# Patient Record
Sex: MALE | Race: WHITE | URBAN - METROPOLITAN AREA
[De-identification: names, ages, dates, MRNs, and addresses within clinical notes are randomized per-mention and may not be internally consistent; named-entity substitution may affect disease eponyms.]

---

## 2017-10-05 ENCOUNTER — PRE VISIT (OUTPATIENT)
Dept: UROLOGY | Facility: CLINIC | Age: 37
End: 2017-10-05

## 2017-10-05 NOTE — TELEPHONE ENCOUNTER
PREVISIT INFORMATION                                                    Jimmie Peña scheduled for future visit at [facility] specialty clinics.    Patient is scheduled to see Nayanai on 10/06/2017  Reason for visit: urinary frequency   Referring provider: mary  Has patient seen previous specialist?   Medical Records:  Left message to return call to clinic     REVIEW                                                      New patient packet mailed to patient: Yes  Medication reconciliation complete: No      PLAN/FOLLOW-UP NEEDED                                                      Patient Reminders Given:    Informed patient to bring an updated list of allergies, medications, pharmacy details and insurance information. Directed patient to come to the 2nd floor, check-in D for their appointment. Informed patient to call back if appointment needs to be cancelled or rescheduled at [phone].    Reminded patient to bring any outside records regarding this appointment or have them faxed to clinic at [phone].    Reminded patient to complete and bring in urology questionnaire. Also for patient to please come with a full bladder and to ask , if early to get staff member for sample.

## 2019-09-03 ENCOUNTER — RECORDS - HEALTHEAST (OUTPATIENT)
Dept: ADMINISTRATIVE | Facility: OTHER | Age: 39
End: 2019-09-03

## 2020-07-29 ENCOUNTER — COMMUNICATION - HEALTHEAST (OUTPATIENT)
Dept: FAMILY MEDICINE | Facility: CLINIC | Age: 40
End: 2020-07-29

## 2020-07-29 ENCOUNTER — OFFICE VISIT - HEALTHEAST (OUTPATIENT)
Dept: FAMILY MEDICINE | Facility: CLINIC | Age: 40
End: 2020-07-29

## 2020-07-30 ENCOUNTER — OFFICE VISIT - HEALTHEAST (OUTPATIENT)
Dept: FAMILY MEDICINE | Facility: CLINIC | Age: 40
End: 2020-07-30

## 2020-07-30 DIAGNOSIS — H65.01 RIGHT ACUTE SEROUS OTITIS MEDIA, RECURRENCE NOT SPECIFIED: ICD-10-CM

## 2020-09-15 ENCOUNTER — RECORDS - HEALTHEAST (OUTPATIENT)
Dept: ADMINISTRATIVE | Facility: OTHER | Age: 40
End: 2020-09-15

## 2021-06-10 NOTE — PROGRESS NOTES
"Patrizia Peña is a 39 y.o. male who is being evaluated via a billable video visit.      The patient has been notified of following:     \"This video visit will be conducted via a call between you and your physician/provider. We have found that certain health care needs can be provided without the need for an in-person physical exam.  This service lets us provide the care you need with a video conversation.  If a prescription is necessary we can send it directly to your pharmacy.  If lab work is needed we can place an order for that and you can then stop by our lab to have the test done at a later time.    Video visits are billed at different rates depending on your insurance coverage. Please reach out to your insurance provider with any questions.    If during the course of the call the physician/provider feels a video visit is not appropriate, you will not be charged for this service.\"    Patient has given verbal consent to a Video visit? Yes  How would you like to obtain your AVS? AVS Preference: Mail a copy.    Will anyone else be joining your video visit? No        Video Start Time: 131    Additional provider notes: 39-year-old male following up via virtual visit for right-sided ear pain is been present for approximately 3 days.  Is intermittent but radiates down his right jaw.  It is not present on the left side of his jaw or his left ear.  He has been using Tylenol to help reduce the pain is been using 3 tablets of Tylenol over the last 24 hours.  He says it is difficult to chew on the right side of his jaw.    Patient denies any cough, fever, shortness of breath or headache.  He has not had any emesis.  He has not no difficulty swallowing.    He notes no change in hearing.  The pain is a throbbing type of pain  He admits that he may have cleaned his right ear too vigorously with a Q-tip.  No recent travel history noted  Patient had similar pain in the past in 2017 he went to the emergency room that " note was reviewed he was asked to use a peroxide the ear exam was noted to be normal.    Video-Visit Details    Type of service:  Video Visit    Video End Time (time video stopped): 148  Originating Location (pt. Location): Home    Distant Location (provider location):  [facility]      Platform used for Video Visit: AmWell  Examination  Moderate built male sitting in a living room no acute distress  HEENT no lymph enlargement noted by the patient no sinus tenderness noted by the patient pupils are equal reactive to light  No tenderness noted over the right auricle    1. Right acute serous otitis media, recurrence not specified Augmentin started for the patient side effects discussed in detail if his symptoms persist after the antibiotics are completed he needs to come in and be evaluated amoxicillin-clavulanate (AUGMENTIN) 875-125 mg per tablet         Albert Barrera MD

## 2021-06-10 NOTE — TELEPHONE ENCOUNTER
Who is calling:  Patient   Reason for Call:  Patient states his right ear is very painful for 3 days ,he also scheduled Evist  . Per patient no fever, cold or cough . Patient is requesting providers suggestions.   Date of last appointment with primary care: unknown   Okay to leave a detailed message: No

## 2021-07-26 ENCOUNTER — TRANSFERRED RECORDS (OUTPATIENT)
Dept: HEALTH INFORMATION MANAGEMENT | Facility: CLINIC | Age: 41
End: 2021-07-26

## 2021-08-21 ENCOUNTER — HEALTH MAINTENANCE LETTER (OUTPATIENT)
Age: 41
End: 2021-08-21

## 2021-10-16 ENCOUNTER — HEALTH MAINTENANCE LETTER (OUTPATIENT)
Age: 41
End: 2021-10-16

## 2022-01-13 ENCOUNTER — NURSE TRIAGE (OUTPATIENT)
Dept: NURSING | Facility: CLINIC | Age: 42
End: 2022-01-13

## 2022-01-13 NOTE — TELEPHONE ENCOUNTER
Pt reports he just tested positive for Covid on 1/13/2022. Pt reports he thinks he may have started symptoms on 1/12/2022. Pt reports cough, and congestion, runny nose, nasal congestion, fatigue, body aches, headache,  sore throat, and mild chest tightness.  Pt denies any shortness of breath, or difficulty breathing.   Care advice given, isolation was discussed, increase fluid intake, use of humidified air, Tylenol for temp over 102.0, use of Neti pot, warm chicken broth or apple juice and per protocol pt should be able to treat this at home. Pt agrees with plan, and was advised to call back if he develops shortness of breath, difficulty breathing, increased chest tightness, or if symptoms worsen. Pt verbalized understanding.     COVID 19 Nurse Triage Plan/Patient Instructions    Please be aware that novel coronavirus (COVID-19) may be circulating in the community. If you develop symptoms such as fever, cough, or SOB or if you have concerns about the presence of another infection including coronavirus (COVID-19), please contact your health care provider or visit [email or web address].     Disposition/Instructions    Home care recommended. Follow home care protocol based instructions.    Thank you for taking steps to prevent the spread of this virus.  o Limit your contact with others.  o Wear a simple mask to cover your cough.  o Wash your hands well and often.    Resources    [facility]: About COVID-19: [email or web address]    CDC: What to Do If You're Sick: www.cdc.gov/coronavirus/2019-ncov/about/steps-when-sick.html    CDC: Ending Home Isolation: www.cdc.gov/coronavirus/2019-ncov/hcp/disposition-in-home-patients.html     CDC: Caring for Someone: www.cdc.gov/coronavirus/2019-ncov/if-you-are-sick/care-for-someone.html     MD: Interim Guidance for Hospital Discharge to Home: www.health.[facility].[place]/diseases/coronavirus/hcp/hospdischarge.pdf    [facility] clinical trials  (COVID-19 research studies): [email or web address]-clinical-trials     Below are the COVID-19 hotlines at the [place] Department of Health ([facility]). Interpreters are available.   o For health questions: Call 356-807-0846 or 1-957.290.4328 (7 a.m. to 7 p.m.)  o For questions about schools and childcare: Call 442-991-6987 or 1-962.836.6147 (7 a.m. to 7 p.m.)     Reason for Disposition    COVID-19 Home Isolation, questions about    Additional Information    Negative: SEVERE difficulty breathing (e.g., struggling for each breath, speaks in single words)    Negative: Difficult to awaken or acting confused (e.g., disoriented, slurred speech)    Negative: Bluish (or gray) lips or face now    Negative: Shock suspected (e.g., cold/pale/clammy skin, too weak to stand, low BP, rapid pulse)    Negative: Sounds like a life-threatening emergency to the triager    Negative: [1] COVID-19 exposure AND [2] no symptoms    Negative: COVID-19 vaccine reaction suspected (e.g., fever, headache, muscle aches) occurring 1 to 3 days after getting vaccine    Negative: COVID-19 vaccine, questions about    Negative: [1] Lives with someone known to have influenza (flu test positive) AND [2] flu-like symptoms (e.g., cough, runny nose, sore throat, SOB; with or without fever)    Negative: [1] Adult with possible COVID-19 symptoms AND [2] triager concerned about severity of symptoms or other causes    Negative: COVID-19 and breastfeeding, questions about    Negative: SEVERE or constant chest pain or pressure (Exception: mild central chest pain, present only when coughing)    Negative: MODERATE difficulty breathing (e.g., speaks in phrases, SOB even at rest, pulse 100-120)    Negative: [1] Headache AND [2] stiff neck (can't touch chin to chest)    Negative: MILD difficulty breathing (e.g., minimal/no SOB at rest, SOB with walking, pulse <100)    Negative: Chest pain or pressure    Negative: Patient sounds very sick or weak to the triager     Negative: Fever > 103 F (39.4 C)    Negative: [1] Fever > 101 F (38.3 C) AND [2] age > 60 years    Negative: [1] Fever > 100.0 F (37.8 C) AND [2] bedridden (e.g., nursing home patient, CVA, chronic illness, recovering from surgery)    Negative: HIGH RISK for severe COVID complications (e.g., age > 64 years, obesity with BMI > 25, pregnant, chronic lung disease or other chronic medical condition)  (Exception: Already seen by PCP and no new or worsening symptoms.)    Negative: [1] HIGH RISK patient AND [2] influenza is widespread in the community AND [3] ONE OR MORE respiratory symptoms: cough, sore throat, runny or stuffy nose    Negative: [1] HIGH RISK patient AND [2] influenza exposure within the last 7 days AND [3] ONE OR MORE respiratory symptoms: cough, sore throat, runny or stuffy nose    Negative: [1] COVID-19 infection suspected by caller or triager AND [2] mild symptoms (cough, fever, or others) AND [3] negative COVID-19 rapid test    Negative: Fever present > 3 days (72 hours)    Negative: [1] Fever returns after gone for over 24 hours AND [2] symptoms worse or not improved    Negative: [1] Continuous (nonstop) coughing interferes with work or school AND [2] no improvement using cough treatment per protocol    Negative: Cough present > 3 weeks    Negative: [1] COVID-19 diagnosed by positive lab test AND [2] NO symptoms (e.g., cough, fever, others)    Negative: [1] COVID-19 diagnosed by positive lab test AND [2] mild symptoms (e.g., cough, fever, others) AND [3] no complications or SOB    Negative: [1] COVID-19 diagnosed by doctor (or NP/PA) AND [2] mild symptoms (e.g., cough, fever, others) AND [3] no complications or SOB    Negative: [1] COVID-19 diagnosed AND [2] has mild nausea, vomiting or diarrhea    Protocols used: CORONAVIRUS (COVID-19) DIAGNOSED OR [id number] 8.25.2021

## 2022-01-15 ENCOUNTER — APPOINTMENT (OUTPATIENT)
Dept: CT IMAGING | Facility: HOSPITAL | Age: 42
End: 2022-01-15
Attending: EMERGENCY MEDICINE
Payer: COMMERCIAL

## 2022-01-15 ENCOUNTER — HOSPITAL ENCOUNTER (EMERGENCY)
Facility: HOSPITAL | Age: 42
Discharge: HOME | End: 2022-01-15
Attending: EMERGENCY MEDICINE | Admitting: EMERGENCY MEDICINE
Payer: COMMERCIAL

## 2022-01-15 VITALS
TEMPERATURE: 99.1 F | WEIGHT: 235 LBS | HEART RATE: 84 BPM | RESPIRATION RATE: 16 BRPM | HEIGHT: 68 IN | OXYGEN SATURATION: 99 % | SYSTOLIC BLOOD PRESSURE: 108 MMHG | BODY MASS INDEX: 35.61 KG/M2 | DIASTOLIC BLOOD PRESSURE: 68 MMHG

## 2022-01-15 DIAGNOSIS — U07.1 INFECTION DUE TO 2019 NOVEL CORONAVIRUS: ICD-10-CM

## 2022-01-15 LAB
ALBUMIN SERPL-MCNC: 3.8 G/DL (ref 3.5–5)
ALP SERPL-CCNC: 75 U/L (ref 45–120)
ALT SERPL W P-5'-P-CCNC: 25 U/L (ref 0–45)
ANION GAP SERPL CALCULATED.3IONS-SCNC: 11 MMOL/L (ref 5–18)
AST SERPL W P-5'-P-CCNC: 29 U/L (ref 0–40)
ATRIAL RATE - MUSE: 70 BPM
BASOPHILS # BLD AUTO: 0 10E3/UL (ref 0–0.2)
BASOPHILS NFR BLD AUTO: 0 %
BILIRUB SERPL-MCNC: 1 MG/DL (ref 0–1)
BUN SERPL-MCNC: 16 MG/DL (ref 8–22)
C REACTIVE PROTEIN LHE: 5.5 MG/DL (ref 0–0.8)
CALCIUM SERPL-MCNC: 9.2 MG/DL (ref 8.5–10.5)
CHLORIDE BLD-SCNC: 104 MMOL/L (ref 98–107)
CO2 SERPL-SCNC: 23 MMOL/L (ref 22–31)
CREAT SERPL-MCNC: 0.75 MG/DL (ref 0.7–1.3)
D DIMER PPP FEU-MCNC: 1.02 UG/ML FEU (ref 0–0.5)
DIASTOLIC BLOOD PRESSURE - MUSE: NORMAL MMHG
EOSINOPHIL # BLD AUTO: 0.1 10E3/UL (ref 0–0.7)
EOSINOPHIL NFR BLD AUTO: 2 %
ERYTHROCYTE [DISTWIDTH] IN BLOOD BY AUTOMATED COUNT: 12.1 % (ref 10–15)
GFR SERPL CREATININE-BSD FRML MDRD: >90 ML/MIN/1.73M2
GLUCOSE BLD-MCNC: 108 MG/DL (ref 70–125)
HCT VFR BLD AUTO: 42.9 % (ref 40–53)
HGB BLD-MCNC: 15 G/DL (ref 13.3–17.7)
HOLD SPECIMEN: NORMAL
IMM GRANULOCYTES # BLD: 0 10E3/UL
IMM GRANULOCYTES NFR BLD: 0 %
INTERPRETATION ECG - MUSE: NORMAL
LIPASE SERPL-CCNC: 20 U/L (ref 0–52)
LYMPHOCYTES # BLD AUTO: 1.9 10E3/UL (ref 0.8–5.3)
LYMPHOCYTES NFR BLD AUTO: 32 %
MAGNESIUM SERPL-MCNC: 2 MG/DL (ref 1.8–2.6)
MCH RBC QN AUTO: 30.1 PG (ref 26.5–33)
MCHC RBC AUTO-ENTMCNC: 35 G/DL (ref 31.5–36.5)
MCV RBC AUTO: 86 FL (ref 78–100)
MONOCYTES # BLD AUTO: 0.6 10E3/UL (ref 0–1.3)
MONOCYTES NFR BLD AUTO: 10 %
NEUTROPHILS # BLD AUTO: 3.3 10E3/UL (ref 1.6–8.3)
NEUTROPHILS NFR BLD AUTO: 56 %
NRBC # BLD AUTO: 0 10E3/UL
NRBC BLD AUTO-RTO: 0 /100
P AXIS - MUSE: 11 DEGREES
PLATELET # BLD AUTO: 197 10E3/UL (ref 150–450)
POTASSIUM BLD-SCNC: 3.9 MMOL/L (ref 3.5–5)
PR INTERVAL - MUSE: 184 MS
PROT SERPL-MCNC: 8.1 G/DL (ref 6–8)
QRS DURATION - MUSE: 100 MS
QT - MUSE: 382 MS
QTC - MUSE: 412 MS
R AXIS - MUSE: 59 DEGREES
RBC # BLD AUTO: 4.99 10E6/UL (ref 4.4–5.9)
SODIUM SERPL-SCNC: 138 MMOL/L (ref 136–145)
SYSTOLIC BLOOD PRESSURE - MUSE: NORMAL MMHG
T AXIS - MUSE: 29 DEGREES
TROPONIN I SERPL-MCNC: <0.01 NG/ML (ref 0–0.29)
VENTRICULAR RATE- MUSE: 70 BPM
WBC # BLD AUTO: 5.9 10E3/UL (ref 4–11)

## 2022-01-15 PROCEDURE — 83735 ASSAY OF MAGNESIUM: CPT | Performed by: EMERGENCY MEDICINE

## 2022-01-15 PROCEDURE — 83690 ASSAY OF LIPASE: CPT | Performed by: EMERGENCY MEDICINE

## 2022-01-15 PROCEDURE — 258N000003 HC RX IP 258 OP 636: Performed by: EMERGENCY MEDICINE

## 2022-01-15 PROCEDURE — 85379 FIBRIN DEGRADATION QUANT: CPT | Performed by: EMERGENCY MEDICINE

## 2022-01-15 PROCEDURE — 86140 C-REACTIVE PROTEIN: CPT | Performed by: EMERGENCY MEDICINE

## 2022-01-15 PROCEDURE — 96374 THER/PROPH/DIAG INJ IV PUSH: CPT | Mod: 59

## 2022-01-15 PROCEDURE — 250N000011 HC RX IP 250 OP 636: Performed by: EMERGENCY MEDICINE

## 2022-01-15 PROCEDURE — 84484 ASSAY OF TROPONIN QUANT: CPT | Performed by: EMERGENCY MEDICINE

## 2022-01-15 PROCEDURE — 93005 ELECTROCARDIOGRAM TRACING: CPT | Performed by: EMERGENCY MEDICINE

## 2022-01-15 PROCEDURE — 99285 EMERGENCY DEPT VISIT HI MDM: CPT | Mod: 25

## 2022-01-15 PROCEDURE — 36415 COLL VENOUS BLD VENIPUNCTURE: CPT | Performed by: EMERGENCY MEDICINE

## 2022-01-15 PROCEDURE — 80053 COMPREHEN METABOLIC PANEL: CPT | Performed by: EMERGENCY MEDICINE

## 2022-01-15 PROCEDURE — 71275 CT ANGIOGRAPHY CHEST: CPT

## 2022-01-15 PROCEDURE — 85025 COMPLETE CBC W/AUTO DIFF WBC: CPT | Performed by: EMERGENCY MEDICINE

## 2022-01-15 RX ORDER — IOPAMIDOL 755 MG/ML
100 INJECTION, SOLUTION INTRAVASCULAR ONCE
Status: COMPLETED | OUTPATIENT
Start: 2022-01-15 | End: 2022-01-15

## 2022-01-15 RX ORDER — ONDANSETRON 4 MG/1
4-8 TABLET, ORALLY DISINTEGRATING ORAL EVERY 8 HOURS PRN
Qty: 20 TABLET | Refills: 0 | Status: SHIPPED | OUTPATIENT
Start: 2022-01-15

## 2022-01-15 RX ORDER — ONDANSETRON 4 MG/1
4-8 TABLET, ORALLY DISINTEGRATING ORAL EVERY 8 HOURS PRN
Qty: 20 TABLET | Refills: 0 | Status: SHIPPED | OUTPATIENT
Start: 2022-01-15 | End: 2022-01-15

## 2022-01-15 RX ORDER — ONDANSETRON 2 MG/ML
8 INJECTION INTRAMUSCULAR; INTRAVENOUS ONCE
Status: COMPLETED | OUTPATIENT
Start: 2022-01-15 | End: 2022-01-15

## 2022-01-15 RX ADMIN — IOPAMIDOL 100 ML: 755 INJECTION, SOLUTION INTRAVENOUS at 15:54

## 2022-01-15 RX ADMIN — ONDANSETRON 8 MG: 2 INJECTION INTRAMUSCULAR; INTRAVENOUS at 12:56

## 2022-01-15 RX ADMIN — SODIUM CHLORIDE 500 ML: 9 INJECTION, SOLUTION INTRAVENOUS at 12:59

## 2022-01-15 ASSESSMENT — ENCOUNTER SYMPTOMS
COUGH: 1
CONFUSION: 0
NECK STIFFNESS: 0
ABDOMINAL PAIN: 0
SHORTNESS OF BREATH: 0
FEVER: 0
LIGHT-HEADEDNESS: 1
DIZZINESS: 1
COLOR CHANGE: 0
NAUSEA: 1
WEAKNESS: 1
EYE REDNESS: 0
HEADACHES: 0
VOMITING: 1
DIFFICULTY URINATING: 0
ARTHRALGIAS: 0

## 2022-01-15 ASSESSMENT — MIFFLIN-ST. JEOR: SCORE: 1940.45

## 2022-01-15 NOTE — ED TRIAGE NOTES
Patient states  diagnosised with Covid one week ago, initially feeling well, within the last 3-4 days ago developed weakness, increasing dizziness, nausea.

## 2022-01-15 NOTE — ED PROVIDER NOTES
EMERGENCY DEPARTMENT ENCOUNTER     NAME: Casey Franco   AGE: 41 year old male   YOB: 1980   MRN: [id number]   EVALUATION DATE & TIME: No admission date for patient encounter.   PCP: No primary care provider on file.     Chief Complaint   Patient presents with     Covid Concern   :    FINAL IMPRESSION       1. Infection due to 2019 novel coronavirus           ED COURSE & MEDICAL DECISION MAKING    10:30 AM I performed my initial history and physical exam as well as discussed ED course and plan which includes labs and swabs.      Pertinent Labs & Imaging studies reviewed. (See chart for details)   41 year old male  presents to the Emergency Department for evaluation of Covid symptoms. Initial Vitals Reviewed. Initial exam notable for generally well-appearing patient with no respiratory distress, pulse ox 96%.  Patient did have a slightly low blood pressure at 96/58, and he was given some IV fluids as he has been having GI symptoms with COVID, vomiting and appeared dehydrated.  Fortunately his creatinine and electrolytes are acceptable, and he felt symptomatically improved after IV fluids and Zofran.  Due to a complaint of hemoptysis as well as lung pain, I did get a D-dimer which was elevated.  At time of signout, CT PE study is pending.  I anticipate the patient can be discharged with home Zofran and supportive care as he would not otherwise meet criteria for admission if the CT scan is negative.           At the conclusion of the encounter I discussed the results of all of the tests and the disposition. The questions were answered. The patient or family acknowledged understanding and was agreeable with the care plan.       MEDICATIONS GIVEN IN THE EMERGENCY:   Medications   0.9% sodium chloride BOLUS (0 mLs Intravenous Stopped 1/15/22 1325)   ondansetron (ZOFRAN) injection 8 mg (8 mg Intravenous Given 1/15/22 1256)      NEW PRESCRIPTIONS STARTED AT TODAY'S ER VISIT   New Prescriptions    No  medications on file     ================================================================   HISTORY OF PRESENT ILLNESS       Patient information was obtained from: Patient   Use of Intrepreter: N/A   Casey Franco is a 41 year old male with history of chronic vertigo who presents via private car with mother for evaluation of Covid symptoms.     Patient reports being diagnosed with Covid one week ago (1/11/22), noting that he was initially feeling well. Starting 4 days ago (1/11/22) he started developping weakness, progressing dizziness, lightheadedness, nausea, and vomiting. Patient also reports some hemoptysis this morning and endorses some pain in his lungs. He remarks his vomiting exacerbates his chronic vertigo. Patient is not covid vaccinated. No other complaints at this time.     Of note, patient is with his mother who has also tested positive for covid on 1/13/22.       ================================================================    REVIEW OF SYSTEMS       Review of Systems   Constitutional: Negative for fever.   HENT: Negative for congestion.    Eyes: Negative for redness.   Respiratory: Positive for cough (with blood). Negative for shortness of breath.    Cardiovascular: Negative for chest pain.   Gastrointestinal: Positive for nausea and vomiting. Negative for abdominal pain.   Genitourinary: Negative for difficulty urinating.   Musculoskeletal: Negative for arthralgias and neck stiffness.   Skin: Negative for color change.   Neurological: Positive for dizziness, weakness and light-headedness. Negative for headaches.   Psychiatric/Behavioral: Negative for confusion.   All other systems reviewed and are negative.       PAST HISTORY     PAST MEDICAL HISTORY:   No past medical history on file.   History reviewed.  No pertinent history  PAST SURGICAL HISTORY:   No past surgical history on file.   CURRENT MEDICATIONS:   No current outpatient medications on file.    ALLERGIES:   No Known Allergies  "  FAMILY HISTORY:   No family history on file.   SOCIAL HISTORY:   Social History     Socioeconomic History     Marital status: Not on file     Spouse name: Not on file     Number of children: Not on file     Years of education: Not on file     Highest education level: Not on file   Occupational History     Not on file   Tobacco Use     Smoking status: Not on file     Smokeless tobacco: Not on file   Substance and Sexual Activity     Alcohol use: Not on file     Drug use: Not on file     Sexual activity: Not on file   Other Topics Concern     Not on file   Social History Narrative     Not on file     Social Determinants of Health     Financial Resource Strain: Not on file   Food Insecurity: Not on file   Transportation Needs: Not on file   Physical Activity: Not on file   Stress: Not on file   Social Connections: Not on file   Intimate Partner Violence: Not on file   Housing Stability: Not on file        VITALS  Patient Vitals for the past 24 hrs:   BP Temp Pulse Resp SpO2 Height Weight   01/15/22 1300 96/58 -- 59 20 96 % -- --   01/15/22 1110 98/65 98.2  F (36.8  C) 80 20 98 % 1.727 m (5' 8\") 106.6 kg (235 lb)        ================================================================    PHYSICAL EXAM     VITAL SIGNS: BP 96/58   Pulse 59   Temp 98.2  F (36.8  C)   Resp 20   Ht 1.727 m (5' 8\")   Wt 106.6 kg (235 lb)   SpO2 96%   BMI 35.73 kg/m     Constitutional:  Awake, no acute distress   HENT:  Atraumatic, oropharynx without exudate or erythema, membranes moist  Lymph:  No adenopathy  Eyes: EOM intact, PERRL, no injection  Neck: Supple  Respiratory:  Clear to auscultation bilaterally, no wheezes or crackles   Cardiovascular:  Regular rate and rhythm, single S1 and S2   GI:  Soft, nontender, nondistended, no rebound or guarding   Musculoskeletal:  Moves all extremities, no lower extremity edema, no deformities    Skin:  Warm, dry  Neurologic:  Alert and oriented x3, no focal deficits noted "       ================================================================  LAB       All pertinent labs reviewed and interpreted.   Labs Ordered and Resulted from Time of ED Arrival to Time of ED Departure   COMPREHENSIVE METABOLIC PANEL - Abnormal       Result Value    Sodium 138      Potassium 3.9      Chloride 104      Carbon Dioxide (CO2) 23      Anion Gap 11      Urea Nitrogen 16      Creatinine 0.75      Calcium 9.2      Glucose 108      Alkaline Phosphatase 75      AST 29      ALT 25      Protein Total 8.1 (*)     Albumin 3.8      Bilirubin Total 1.0      GFR Estimate >90     D DIMER QUANTITATIVE - Abnormal    D-Dimer Quantitative 1.02 (*)    CRP INFLAMMATION - Abnormal    CRP 5.5 (*)    LIPASE - Normal    Lipase 20     MAGNESIUM - Normal    Magnesium 2.0     TROPONIN I - Normal    Troponin I <0.01     CBC WITH PLATELETS AND DIFFERENTIAL - Normal    WBC Count 5.9      RBC Count 4.99      Hemoglobin 15.0      Hematocrit 42.9      MCV 86      MCH 30.1      MCHC 35.0      RDW 12.1      Platelet Count 197          ===============================================================  RADIOLOGY       Reviewed all pertinent imaging. Please see official radiology report.   CT Chest Pulmonary Embolism w Contrast    (Results Pending)         ================================================================  EKG       EKG reviewed interpreted by me shows sinus rhythm with rate of 70, normal axis,  with no acute ST or T wave changes  I have independently reviewed and interpreted the EKG(s) documented above.     ================================================================  PROCEDURES         Jayy CANALES, am serving as a scribe to document services personally performed by Dr. Cohen based on my observation and the provider's statements to me. I, Farrah Cohen MD attest that Jayy Johnson is acting in a scribe capacity, has observed my performance of the services and has documented them in accordance with my  direction.   Farrah Cohen M.D.   Emergency Medicine   [facility] EMERGENCY DEPARTMENT  [facility] [facility] [id number] [phone]  Dept: [phone]        Farrah Cohen MD  01/15/22 1420

## 2022-01-15 NOTE — ED PROVIDER NOTES
"ED Provider In Triage Note  [facility]  Encounter Date: Justin 15, 2022    Chief Complaint   Patient presents with     Covid Concern       Brief HPI:   Patrizia Peña is a 41 year old male presenting to the Emergency Department with a chief complaint of covid infection, tested + 1/13/22.  Pt is unvaccinated.  Pt reports vomiting past 2-3 days, unable to keep much of anything down. Reports associated dyspnea and worsening lightheadedness.  Also reports myalgias, fatigue.  Pt feels very weak, asking to sit/lay down in triage.    Brief Physical Exam:  BP 96/58   Pulse 59   Temp 98.2  F (36.8  C)   Resp 20   Ht 1.727 m (5' 8\")   Wt 106.6 kg (235 lb)   SpO2 96%   BMI 35.73 kg/m    General: Non-toxic appearing  HEENT: Atraumatic  Resp: No respiratory distress  Abdomen: Non-peritoneal  Neuro: Alert, oriented, answers questions appropriately  Psych: Behavior appropriate      Plan Initiated in Triage:  Orders Placed This Encounter     Symptomatic; Unknown Influenza A/B & SARS-CoV2 (COVID-19) Virus PCR Multiplex       PIT Dispo:   Return to lobby while awaiting workup and ED bed availability  Pt reports increasing vomiting and weakness/dizziness.  Labs ordered.    Harsh Willard MD on 1/15/2022 at 11:07 AM    Patient was evaluated by the Physician in Triage due to a limitation of available rooms in the Emergency Department. A plan of care was discussed based on the information obtained on the initial evaluation and patient was consuled to return back to the Emergency Department lobby after this initial evalutaiton until results were obtained or a room became available in the Emergency Department. Patient was counseled not to leave prior to receiving the results of their workup.        Harsh Willard MD  01/15/22 1343    "

## 2022-01-15 NOTE — DISCHARGE INSTRUCTIONS
The tests in the ER today are negative for anything like a blood clot in your lungs, and all symptoms are expected with COVID-19.  Use the nausea medication at home to control the vomiting.  It is now important that you isolate at home and do not leave your house or exposure self to any other people until it has been at least 10 days since your symptoms started AND you have been feeling improved for at least 3 days.  This means if you do not start to feel better until day 9, you can come out of isolation until day 12.    Ibuprofen and Tylenol are safe for fever or aches at home.  Get lots of rest and drink fluids.  It can be helpful to get a pulse oximeter and follow your oxygen levels at home.  If you are feeling significantly short of breath or your oxygen levels dropped into the 80s, these would be a reason to come back to the hospital.      Even after Covid infection, you are eligible for vaccination to help prevent reinfection.  You need to wait 2 weeks after illness before getting your first vaccine.

## 2022-01-16 NOTE — ED PROVIDER NOTES
eMERGENCY dEPARTMENT PROGRESS NOTE         ED COURSE AND MEDICAL DECISION MAKING  Patient was signed out to me by Dr. Cohen at 5:21 PM      Patrizia Peña is a 41 year old male who presents with COVID symptoms.  His CTA came back negative for PE.  He was discharged home with a prescription for Zofran.  He was comfortable with the plan.    LAB  Pertinent labs results reviewed   Labs Ordered and Resulted from Time of ED Arrival to Time of ED Departure   COMPREHENSIVE METABOLIC PANEL - Abnormal       Result Value    Sodium 138      Potassium 3.9      Chloride 104      Carbon Dioxide (CO2) 23      Anion Gap 11      Urea Nitrogen 16      Creatinine 0.75      Calcium 9.2      Glucose 108      Alkaline Phosphatase 75      AST 29      ALT 25      Protein Total 8.1 (*)     Albumin 3.8      Bilirubin Total 1.0      GFR Estimate >90     D DIMER QUANTITATIVE - Abnormal    D-Dimer Quantitative 1.02 (*)    CRP INFLAMMATION - Abnormal    CRP 5.5 (*)    LIPASE - Normal    Lipase 20     MAGNESIUM - Normal    Magnesium 2.0     TROPONIN I - Normal    Troponin I <0.01     CBC WITH PLATELETS AND DIFFERENTIAL    WBC Count 5.9      RBC Count 4.99      Hemoglobin 15.0      Hematocrit 42.9      MCV 86      MCH 30.1      MCHC 35.0      RDW 12.1      Platelet Count 197      % Neutrophils 56      % Lymphocytes 32      % Monocytes 10      % Eosinophils 2      % Basophils 0      % Immature Granulocytes 0      NRBCs per 100 WBC 0      Absolute Neutrophils 3.3      Absolute Lymphocytes 1.9      Absolute Monocytes 0.6      Absolute Eosinophils 0.1      Absolute Basophils 0.0      Absolute Immature Granulocytes 0.0      Absolute NRBCs 0.0             RADIOLOGY    Pertinent imaging reviewed   Please see official radiology report.  CT Chest Pulmonary Embolism w Contrast   Final Result   IMPRESSION:      1.  No acute pulmonary embolism.   2.  Commonly reported imaging features of (COVID-19) pneumonia are present.    3.  Mild splenomegaly.           FINAL IMPRESSION    1. Infection due to 2019 novel coronavirus         DISCHARGE MEDICATIONS  Discharge Medication List as of 1/15/2022  5:19 PM      START taking these medications    Details   ondansetron (ZOFRAN ODT) 4 MG ODT tab Take 1-2 tablets (4-8 mg) by mouth every 8 hours as needed for nausea, Disp-20 tablet, R-0, Local Print              Cindy Barrett MD  01/15/22 7895

## 2022-09-17 ENCOUNTER — HEALTH MAINTENANCE LETTER (OUTPATIENT)
Age: 42
End: 2022-09-17

## 2023-10-07 ENCOUNTER — HEALTH MAINTENANCE LETTER (OUTPATIENT)
Age: 43
End: 2023-10-07

## 2024-11-30 ENCOUNTER — HEALTH MAINTENANCE LETTER (OUTPATIENT)
Age: 44
End: 2024-11-30